# Patient Record
Sex: MALE | URBAN - METROPOLITAN AREA
[De-identification: names, ages, dates, MRNs, and addresses within clinical notes are randomized per-mention and may not be internally consistent; named-entity substitution may affect disease eponyms.]

---

## 2022-05-13 ENCOUNTER — TELEPHONE (OUTPATIENT)
Dept: FAMILY MEDICINE CLINIC | Facility: CLINIC | Age: 52
End: 2022-05-13

## 2024-10-15 ENCOUNTER — TELEPHONE (OUTPATIENT)
Dept: FAMILY MEDICINE CLINIC | Facility: CLINIC | Age: 54
End: 2024-10-15

## 2024-10-15 ENCOUNTER — OFFICE VISIT (OUTPATIENT)
Dept: FAMILY MEDICINE CLINIC | Facility: CLINIC | Age: 54
End: 2024-10-15
Payer: COMMERCIAL

## 2024-10-15 VITALS
WEIGHT: 172.81 LBS | DIASTOLIC BLOOD PRESSURE: 82 MMHG | TEMPERATURE: 98 F | OXYGEN SATURATION: 67 % | SYSTOLIC BLOOD PRESSURE: 136 MMHG | RESPIRATION RATE: 18 BRPM | HEART RATE: 98 BPM

## 2024-10-15 DIAGNOSIS — R33.9 URINARY RETENTION: Primary | ICD-10-CM

## 2024-10-15 LAB
ALBUMIN SERPL-MCNC: 4.7 G/DL (ref 3.2–4.8)
ALBUMIN/GLOB SERPL: 1.6 {RATIO} (ref 1–2)
ALP LIVER SERPL-CCNC: 50 U/L
ALT SERPL-CCNC: 19 U/L
ANION GAP SERPL CALC-SCNC: 7 MMOL/L (ref 0–18)
AST SERPL-CCNC: 19 U/L (ref ?–34)
BASOPHILS # BLD AUTO: 0.06 X10(3) UL (ref 0–0.2)
BASOPHILS NFR BLD AUTO: 0.9 %
BILIRUB SERPL-MCNC: 0.7 MG/DL (ref 0.3–1.2)
BUN BLD-MCNC: 12 MG/DL (ref 9–23)
CALCIUM BLD-MCNC: 10 MG/DL (ref 8.7–10.4)
CHLORIDE SERPL-SCNC: 104 MMOL/L (ref 98–112)
CO2 SERPL-SCNC: 26 MMOL/L (ref 21–32)
COMPLEXED PSA SERPL-MCNC: 0.46 NG/ML (ref ?–4)
CREAT BLD-MCNC: 0.94 MG/DL
EGFRCR SERPLBLD CKD-EPI 2021: 98 ML/MIN/1.73M2 (ref 60–?)
EOSINOPHIL # BLD AUTO: 0.06 X10(3) UL (ref 0–0.7)
EOSINOPHIL NFR BLD AUTO: 0.9 %
ERYTHROCYTE [DISTWIDTH] IN BLOOD BY AUTOMATED COUNT: 13.6 %
FASTING STATUS PATIENT QL REPORTED: YES
GLOBULIN PLAS-MCNC: 2.9 G/DL (ref 2–3.5)
GLUCOSE BLD-MCNC: 98 MG/DL (ref 70–99)
HCT VFR BLD AUTO: 44.7 %
HGB BLD-MCNC: 15.5 G/DL
IMM GRANULOCYTES # BLD AUTO: 0.02 X10(3) UL (ref 0–1)
IMM GRANULOCYTES NFR BLD: 0.3 %
LYMPHOCYTES # BLD AUTO: 1.98 X10(3) UL (ref 1–4)
LYMPHOCYTES NFR BLD AUTO: 28.5 %
MCH RBC QN AUTO: 30.9 PG (ref 26–34)
MCHC RBC AUTO-ENTMCNC: 34.7 G/DL (ref 31–37)
MCV RBC AUTO: 89 FL
MONOCYTES # BLD AUTO: 0.49 X10(3) UL (ref 0.1–1)
MONOCYTES NFR BLD AUTO: 7.1 %
NEUTROPHILS # BLD AUTO: 4.33 X10 (3) UL (ref 1.5–7.7)
NEUTROPHILS # BLD AUTO: 4.33 X10(3) UL (ref 1.5–7.7)
NEUTROPHILS NFR BLD AUTO: 62.3 %
OSMOLALITY SERPL CALC.SUM OF ELEC: 284 MOSM/KG (ref 275–295)
PLATELET # BLD AUTO: 325 10(3)UL (ref 150–450)
POTASSIUM SERPL-SCNC: 4 MMOL/L (ref 3.5–5.1)
PROT SERPL-MCNC: 7.6 G/DL (ref 5.7–8.2)
RBC # BLD AUTO: 5.02 X10(6)UL
SODIUM SERPL-SCNC: 137 MMOL/L (ref 136–145)
WBC # BLD AUTO: 6.9 X10(3) UL (ref 4–11)

## 2024-10-15 PROCEDURE — 3079F DIAST BP 80-89 MM HG: CPT | Performed by: FAMILY MEDICINE

## 2024-10-15 PROCEDURE — 80053 COMPREHEN METABOLIC PANEL: CPT | Performed by: FAMILY MEDICINE

## 2024-10-15 PROCEDURE — 99204 OFFICE O/P NEW MOD 45 MIN: CPT | Performed by: FAMILY MEDICINE

## 2024-10-15 PROCEDURE — 85025 COMPLETE CBC W/AUTO DIFF WBC: CPT | Performed by: FAMILY MEDICINE

## 2024-10-15 PROCEDURE — 3075F SYST BP GE 130 - 139MM HG: CPT | Performed by: FAMILY MEDICINE

## 2024-10-15 RX ORDER — CIPROFLOXACIN 500 MG/1
500 TABLET, FILM COATED ORAL
COMMUNITY
Start: 2024-10-13

## 2024-10-15 RX ORDER — TAMSULOSIN HYDROCHLORIDE 0.4 MG/1
0.4 CAPSULE ORAL
COMMUNITY
Start: 2024-10-13 | End: 2024-10-21

## 2024-10-15 NOTE — PROGRESS NOTES
Patient came in for draw of ordered fasting labs. Patient drawn out of right AC, x 1 attempt and tolerated well.  SST (mint green), lavender and gold tube drawn.

## 2024-10-15 NOTE — TELEPHONE ENCOUNTER
Patient found a new doctor that can see them tomorrow needs a referral put in ASAP for Dr Julisa Norwood with [facility] they have an appointment tomorrow at 2

## 2024-10-15 NOTE — TELEPHONE ENCOUNTER
Pts spouse called office stating that they need the referral ASAP for tomorrow. I explained to pt the process.

## 2024-10-15 NOTE — PATIENT INSTRUCTIONS
Blood work today     Urology referral     Declined flu shot     F/u 3 weeks-1 month for complete px or sooner if needed

## 2024-10-15 NOTE — TELEPHONE ENCOUNTER
Pts spouse called office stating the soonest available appt with Urologist is in 8 days and she thinks this is too long of a time to wait and wants to know if that is ok?

## 2024-10-15 NOTE — PROGRESS NOTES
HPI:     Candido Khan is a 53 year old male presents for    U/C f/u.  He is here with his wife who does provide some translation services for him today per his request.  [facility] in [facility] 2 [facility] ago for acute urinary retention.  Had noticed a few days prior that his urine stream was not as strong.  Then the day of going to [facility] only a small amount of urine was coming out.  We do not have records to assess what was done at that time and only have the patient's history and in after visit summary he received.  He did have a UA done there that was negative for infection.  Was told that he need to follow-up with urology as he may have an anatomical issue leading to this urinary retention with issues with his prostate.  It does not sound that he has ever had a physical before or any blood work done including a PSA level.  Has never had colon cancer screening done and wife states \"that is not necessary at this time\".  Patient was started on Flomax and ciprofloxacin at the urgent care and his urinary stream has improved slightly but is still not what it should be.           Medications (Active prior to today's visit):  Current Outpatient Medications   Medication Sig Dispense Refill    ciprofloxacin 500 MG Oral Tab Take 1 tablet (500 mg total) by mouth.      tamsulosin 0.4 MG Oral Cap Take 1 capsule (0.4 mg total) by mouth.         Allergies:  Allergies[1]    PSFH elements reviewed from today and agreed except as otherwise stated in HPI.  ROS:      Pertinent positives and negatives noted in the the HPI.    PHYSICAL EXAM:     Vitals:    10/15/24 0840   BP: 136/82   BP Location: Left arm   Patient Position: Sitting   Cuff Size: adult   Pulse: 98   Resp: 18   Temp: 98 °F (36.7 °C)   TempSrc: Temporal   SpO2: (!) 67%   Weight: 172 lb 12.8 oz (78.4 kg)     Vital signs reviewed.Appears stated age, well groomed.  Physical Exam  Constitutional:       Appearance: Normal appearance.    Cardiovascular:      Rate and Rhythm: Normal rate and regular rhythm.      Pulses: Normal pulses.      Heart sounds: No murmur heard.     No friction rub. No gallop.   Pulmonary:      Effort: Pulmonary effort is normal. No respiratory distress.      Breath sounds: No wheezing, rhonchi or rales.   Abdominal:      General: Bowel sounds are normal. There is no distension.      Palpations: Abdomen is soft.      Tenderness: There is no abdominal tenderness. There is no right CVA tenderness or left CVA tenderness.   Musculoskeletal:         General: No tenderness.      Cervical back: Neck supple.      Right lower leg: No edema.      Left lower leg: No edema.   Skin:     General: Skin is warm.   Neurological:      General: No focal deficit present.      Mental Status: He is alert.   Psychiatric:         Mood and Affect: Mood normal.         Speech: Speech normal.         Behavior: Behavior normal.          ASSESSMENT/PLAN:   53 year old male with    1. Urinary retention      1.  Urinary retention with uncertain prognosis.  We do not have urgent care records to further evaluate the workup that was done here.  Medical assistant did call over there to obtain records and had to leave a voicemail.  Per wife, UA was negative for infection.  They declined repeat urine testing today.  Do agree that patient needs to follow-up with urology ASAP for his urinary retention.  Gave referral continue on same medications as prescribed by urgent care  2.  Do think patient would benefit from PSA testing as he has never had this done.  Order placed  3.  Will also obtain CBC and CMP and this has not been done previously  4.  If symptoms worsen acutely, recommend evaluation in ER  5.  Follow-up in the next 1 to 2 months for complete physical or sooner if needed    Patient/Caregiver Education: There are no barriers to learning. Medical education done.   Outcome: Patient verbalizes understanding and agrees with plan. Advised to call or RTC if  symptoms persist or worsen.    10/15/2024  Pauline Tobias, DO    Patient understands plan and follow-up.       [1] Not on File

## 2024-10-21 ENCOUNTER — OFFICE VISIT (OUTPATIENT)
Facility: LOCATION | Age: 54
End: 2024-10-21
Payer: COMMERCIAL

## 2024-10-21 DIAGNOSIS — R33.9 URINE RETENTION: ICD-10-CM

## 2024-10-21 DIAGNOSIS — R39.12 WEAK URINARY STREAM: ICD-10-CM

## 2024-10-21 DIAGNOSIS — R30.0 DYSURIA: ICD-10-CM

## 2024-10-21 DIAGNOSIS — R31.29 MICROSCOPIC HEMATURIA: Primary | ICD-10-CM

## 2024-10-21 LAB
APPEARANCE: CLEAR
BILIRUBIN: NEGATIVE
GLUCOSE (URINE DIPSTICK): NEGATIVE MG/DL
KETONES (URINE DIPSTICK): NEGATIVE MG/DL
LEUKOCYTES: NEGATIVE
MULTISTIX LOT#: ABNORMAL NUMERIC
NITRITE, URINE: NEGATIVE
PH, URINE: 6 (ref 4.5–8)
PROTEIN (URINE DIPSTICK): NEGATIVE MG/DL
SPECIFIC GRAVITY: 1.01 (ref 1–1.03)
URINE-COLOR: YELLOW
UROBILINOGEN,SEMI-QN: 0.2 MG/DL (ref 0–1.9)

## 2024-10-21 PROCEDURE — 51798 US URINE CAPACITY MEASURE: CPT | Performed by: PHYSICIAN ASSISTANT

## 2024-10-21 PROCEDURE — 99204 OFFICE O/P NEW MOD 45 MIN: CPT | Performed by: PHYSICIAN ASSISTANT

## 2024-10-21 PROCEDURE — 81015 MICROSCOPIC EXAM OF URINE: CPT | Performed by: PHYSICIAN ASSISTANT

## 2024-10-21 PROCEDURE — 81003 URINALYSIS AUTO W/O SCOPE: CPT | Performed by: PHYSICIAN ASSISTANT

## 2024-10-21 RX ORDER — TAMSULOSIN HYDROCHLORIDE 0.4 MG/1
0.4 CAPSULE ORAL DAILY
Qty: 30 CAPSULE | Refills: 0 | Status: SHIPPED | OUTPATIENT
Start: 2024-10-21 | End: 2024-11-20

## 2024-10-21 NOTE — PROGRESS NOTES
[facility], [address] [place] [address]    Urology Consult Note    History of Present Illness:   Patient is a 53 year old male with no known medical history who presents today for consultation from Dr. Tobias's office for weak urine stream and microscopic hematuria.    Patient was seen at an urgent care for slow urine stream and small voids. No associated dysuria or gross hematuria, but UA +blood. Started on tamsulosin and ciprofloxacin. Unclear if culture positive. At follow up with PCP office 10/15/24 he had some improvement but symptoms were not returned to baseline. Referred to urology.     Patient continues to experience sensation of incomplete emptying, straining to void, and intermittent dysuria. Remains on tamsulosin 0.4mg nightly.    No FH of  cancers.    PVR 0mL.  UA today small blood.    PSA 0.46 and Scr 0.94 10/15/24    HISTORY:  No past medical history on file.   Past Surgical History:   Procedure Laterality Date    Other surgical history  2010    esophegeal surgery      Family History   Problem Relation Age of Onset    No Known Problems Mother     No Known Problems Sister     No Known Problems Daughter     No Known Problems Daughter       Social History:   Social History     Socioeconomic History    Marital status:    Tobacco Use    Smoking status: Never    Smokeless tobacco: Never   Vaping Use    Vaping status: Never Used   Substance and Sexual Activity    Alcohol use: Yes    Drug use: Never        Allergies  Allergies[1]    Review of Systems:   A 10-point review of systems was completed and is negative other than as noted above.    Physical Exam:   There were no vitals taken for this visit.    GENERAL APPEARANCE: well developed, well nourished, in no acute distress  NEUROLOGIC: no localizing neurologic signs, alert and oriented x 3, converses appropriately  HEAD: atraumatic, normocephalic  EYES: sclera non-icteric  ORAL CAVITY: mucosa moist  NECK/THYROID: no obvious masses or  goiter  LUNGS: non-labored breathing  ABDOMEN: soft, nontender, non distended  CVA: no CVA tenderness  INGUINAL CANALS: no hernias  PENILE MEATUS: open and in normal location  PENIS normal  SCROTUM: normal  no varicocele  TESTES: normal anatomy  EPIDIDYMIS: normal anatomy  BAY: 20-25g smooth symmetric without nodule or induration  EXTREMITIES: warm, well-perfused. No clubbing, cyanosis or edema.  SKIN: no obvious rashes    Results:     Laboratory Data:  Lab Results   Component Value Date    WBC 6.9 10/15/2024    HGB 15.5 10/15/2024    .0 10/15/2024     Lab Results   Component Value Date     10/15/2024    K 4.0 10/15/2024     10/15/2024    CO2 26.0 10/15/2024    BUN 12 10/15/2024    GLU 98 10/15/2024    AST 19 10/15/2024    ALT 19 10/15/2024    TP 7.6 10/15/2024    ALB 4.7 10/15/2024    CA 10.0 10/15/2024       Urinalysis Results (last three years):  Recent Labs     10/21/24  1111   SPECGRAVITY 1.015   PHURINE 6.0   NITRITE Negative       Urine Culture Results (last three years):  No results found for: \"URINECUL\"    Imaging  No results found.      Impression:     Patient is a 53 year old male  with no known medical history who presents today for consultation from Dr. Tobias's office for weak urine stream and microscopic hematuria.    Reviewed with patient and wife potential etiologies of microscopic hematuria and his LUTS, including but not limited to stones, urethral stricture, or malignancy. Recommend further evaluation with CT and cystoscopy.     Avoid constipation, bladder irritants, and continue alpha blocker at this time. If unable to void to return to office for evaluation or if renal colic symptoms to seek attention (reviewed in office)    Recommendations:  As above. Continue tamsulosin. CTU and cystoscopy.     Thank you very much for this consult. Please call if there are any questions or concerns.     Angela Kerr PA-C  Urology  [facility]    Date: 10/21/2024           [1]  No Known Allergies

## 2024-10-22 ENCOUNTER — HOSPITAL ENCOUNTER (OUTPATIENT)
Dept: CT IMAGING | Age: 54
Discharge: HOME | End: 2024-10-22
Attending: PHYSICIAN ASSISTANT
Payer: COMMERCIAL

## 2024-10-22 DIAGNOSIS — R31.29 MICROSCOPIC HEMATURIA: ICD-10-CM

## 2024-10-22 DIAGNOSIS — R33.9 URINE RETENTION: ICD-10-CM

## 2024-10-22 PROCEDURE — 76377 3D RENDER W/INTRP POSTPROCES: CPT | Performed by: PHYSICIAN ASSISTANT

## 2024-10-22 PROCEDURE — 74178 CT ABD&PLV WO CNTR FLWD CNTR: CPT | Performed by: PHYSICIAN ASSISTANT

## 2024-10-25 NOTE — PROGRESS NOTES
Clinic Procedure Note    INDICATIONS:      Patient is a 53 year old male with no known medical history who presents today for consultation from Dr. Tobias's office for weak urine stream and microscopic hematuria.     Patient was seen at an urgent care for slow urine stream and small voids. No associated dysuria or gross hematuria, but UA +blood. Started on tamsulosin and ciprofloxacin. Unclear if culture positive. At follow up with PCP office 10/15/24 he had some improvement but symptoms were not returned to baseline. Referred to urology.      Patient continues to experience sensation of incomplete emptying, straining to void, and intermittent dysuria. Remains on tamsulosin 0.4mg nightly.     No FH of  cancers  PVR 0mL.  UA previously small blood.  PSA 0.46 and Scr 0.94 10/15/24    He saw CHRISTIAN with above history.   CTU, cysto were ordered and he was to continue flomax.       CTU 10/22;     Impression  CONCLUSION:  Motion degraded exam.  Within the confines of the exam:  1. No evidence of upper tract urothelial disease.  No hydronephrosis or nephrolithiasis.  2. No acute abnormality in the abdomen or pelvis otherwise.    Doing well today.  Continued improvement in symptoms with Flomax.  Of note, he is a  and he believes the above symptoms were related to stress during his job.    PROCEDURE:       1. Flexible cystourethroscopy    DATE OF PROCEDURE: 10/25/2024     PRE-PROCEDURE DIAGNOSIS: Micro hematuria, LUTS    POST-PROCEDURE DIAGNOSIS: Same     SURGEON: Rd Cosme MD    FINDINGS:    Urethra: Orthotopic meatus, normal caliber urethra throughout without lesions    Prostate: Mildly enlarged without signs of obstruction, mildly high bladder neck, minimal intravesical protrusion    Bladder: Normal mucosa with no papillary lesions or erythema, minimal trabeculation    Ureteral orifices: Orthotopic    Other findings: None    PROCEDURE:   Patient was brought to the procedure suite and a time-out was performed  identifiying the patient,  and procedure to be performed. The risks and benefits of the procedure were once again discussed with the patient including bleeding, infection, and dysuria. The patient agreed to proceed. The patient did not have any signs or symptoms of active UTI.    He was placed in supine position on the table and the penis was prepped and draped in the standard sterile fashion. Urojet was instilled per urethra for local anesthetic effect. A flexible cystoscope was inserted per urethra. The bladder was fully inspected (including retroflexion) and showed findings as above. Both ureteral orifices were orthotopic. The prostate was carefully viewed on removal of the scope, with findings as above. The scope was then carefully removed.    There were no complications and the patient tolerated the procedure well.    IMPRESSION AND PLAN:     LUTS, microhematuria  Normal CT urogram.  Normal cystoscopy.  Behavioral modification discussed.  He should continue Flomax.  He will return in 4 to 6 months with CISCO Cosme MD  Staff Urologist  [facility]  Office: [phone]

## 2024-10-28 ENCOUNTER — PROCEDURE (OUTPATIENT)
Dept: SURGERY | Facility: CLINIC | Age: 54
End: 2024-10-28
Payer: COMMERCIAL

## 2024-10-28 DIAGNOSIS — R30.0 DYSURIA: Primary | ICD-10-CM

## 2024-10-28 LAB
APPEARANCE: CLEAR
BILIRUBIN: NEGATIVE
GLUCOSE (URINE DIPSTICK): NEGATIVE MG/DL
KETONES (URINE DIPSTICK): NEGATIVE MG/DL
LEUKOCYTES: NEGATIVE
MULTISTIX LOT#: ABNORMAL NUMERIC
NITRITE, URINE: NEGATIVE
PH, URINE: 5.5 (ref 4.5–8)
PROTEIN (URINE DIPSTICK): NEGATIVE MG/DL
SPECIFIC GRAVITY: 1 (ref 1–1.03)
URINE-COLOR: YELLOW
UROBILINOGEN,SEMI-QN: 0.2 MG/DL (ref 0–1.9)

## 2024-10-28 PROCEDURE — 52000 CYSTOURETHROSCOPY: CPT | Performed by: UROLOGY

## 2024-10-28 PROCEDURE — 81003 URINALYSIS AUTO W/O SCOPE: CPT | Performed by: UROLOGY

## 2024-10-28 RX ORDER — SULFAMETHOXAZOLE AND TRIMETHOPRIM 800; 160 MG/1; MG/1
1 TABLET ORAL ONCE
Status: DISCONTINUED | OUTPATIENT
Start: 2024-10-28 | End: 2024-10-28

## 2024-10-28 RX ORDER — CIPROFLOXACIN 500 MG/1
500 TABLET, FILM COATED ORAL ONCE
Status: COMPLETED | OUTPATIENT
Start: 2024-10-28 | End: 2024-10-28

## 2024-10-28 RX ADMIN — CIPROFLOXACIN 500 MG: 500 TABLET, FILM COATED ORAL at 09:12:00

## 2024-11-19 RX ORDER — TAMSULOSIN HYDROCHLORIDE 0.4 MG/1
0.4 CAPSULE ORAL DAILY
Qty: 30 CAPSULE | Refills: 0 | Status: SHIPPED | OUTPATIENT
Start: 2024-11-19